# Patient Record
Sex: MALE | Race: WHITE
[De-identification: names, ages, dates, MRNs, and addresses within clinical notes are randomized per-mention and may not be internally consistent; named-entity substitution may affect disease eponyms.]

---

## 2017-04-10 ENCOUNTER — HOSPITAL ENCOUNTER (EMERGENCY)
Dept: HOSPITAL 17 - PHEFT | Age: 37
Discharge: HOME | End: 2017-04-10
Payer: SELF-PAY

## 2017-04-10 VITALS — WEIGHT: 168.65 LBS | BODY MASS INDEX: 23.61 KG/M2 | HEIGHT: 71 IN

## 2017-04-10 VITALS
HEART RATE: 70 BPM | OXYGEN SATURATION: 98 % | DIASTOLIC BLOOD PRESSURE: 77 MMHG | SYSTOLIC BLOOD PRESSURE: 108 MMHG | TEMPERATURE: 98.1 F | RESPIRATION RATE: 16 BRPM

## 2017-04-10 DIAGNOSIS — S05.02XA: Primary | ICD-10-CM
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
## 2017-04-10 DIAGNOSIS — F17.210: ICD-10-CM
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
## 2017-04-10 PROCEDURE — 99283 EMERGENCY DEPT VISIT LOW MDM: CPT
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
## 2018-02-21 ENCOUNTER — HOSPITAL ENCOUNTER (EMERGENCY)
Dept: HOSPITAL 17 - NEPD | Age: 38
Discharge: HOME | End: 2018-02-21
Payer: COMMERCIAL

## 2018-02-21 VITALS
SYSTOLIC BLOOD PRESSURE: 122 MMHG | OXYGEN SATURATION: 97 % | RESPIRATION RATE: 16 BRPM | HEART RATE: 111 BPM | DIASTOLIC BLOOD PRESSURE: 81 MMHG | TEMPERATURE: 98.2 F

## 2018-02-21 DIAGNOSIS — G89.29: ICD-10-CM

## 2018-02-21 DIAGNOSIS — M54.9: ICD-10-CM

## 2018-02-21 DIAGNOSIS — Z79.891: ICD-10-CM
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
## 2018-02-21 DIAGNOSIS — F17.210: ICD-10-CM
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
## 2018-02-21 DIAGNOSIS — S01.01XA: Primary | ICD-10-CM

## 2018-02-21 DIAGNOSIS — S16.1XXA: ICD-10-CM

## 2018-02-21 PROCEDURE — 71046 X-RAY EXAM CHEST 2 VIEWS: CPT

## 2018-02-21 PROCEDURE — 12002 RPR S/N/AX/GEN/TRNK2.6-7.5CM: CPT

## 2018-02-21 PROCEDURE — 72125 CT NECK SPINE W/O DYE: CPT

## 2018-02-21 PROCEDURE — 70450 CT HEAD/BRAIN W/O DYE: CPT
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
## 2018-02-24 ENCOUNTER — HOSPITAL ENCOUNTER (EMERGENCY)
Dept: HOSPITAL 17 - NEPD | Age: 38
Discharge: HOME | End: 2018-02-24
Payer: COMMERCIAL

## 2018-02-24 VITALS — HEIGHT: 69 IN | BODY MASS INDEX: 25.14 KG/M2 | WEIGHT: 169.76 LBS

## 2018-02-24 DIAGNOSIS — F17.200: ICD-10-CM

## 2018-02-24 DIAGNOSIS — G44.319: Primary | ICD-10-CM

## 2018-02-24 PROCEDURE — 99283 EMERGENCY DEPT VISIT LOW MDM: CPT
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
## 2018-03-10 ENCOUNTER — HOSPITAL ENCOUNTER (EMERGENCY)
Dept: HOSPITAL 17 - NED | Age: 38
Discharge: LEFT BEFORE DISCHARGE | End: 2018-03-10
Payer: SELF-PAY

## 2018-03-10 ENCOUNTER — HOSPITAL ENCOUNTER (EMERGENCY)
Dept: HOSPITAL 17 - NEPD | Age: 38
Discharge: HOME | End: 2018-03-10
Payer: SELF-PAY

## 2018-03-10 VITALS
OXYGEN SATURATION: 98 % | TEMPERATURE: 98.5 F | DIASTOLIC BLOOD PRESSURE: 77 MMHG | RESPIRATION RATE: 18 BRPM | HEART RATE: 124 BPM | SYSTOLIC BLOOD PRESSURE: 132 MMHG

## 2018-03-10 VITALS — WEIGHT: 165.35 LBS | BODY MASS INDEX: 25.95 KG/M2 | HEIGHT: 67 IN

## 2018-03-10 DIAGNOSIS — Z09: Primary | ICD-10-CM

## 2018-03-10 DIAGNOSIS — Z53.21: ICD-10-CM

## 2018-03-10 DIAGNOSIS — Z48.02: ICD-10-CM

## 2018-03-10 DIAGNOSIS — Z48.02: Primary | ICD-10-CM

## 2018-03-10 PROCEDURE — 99281 EMR DPT VST MAYX REQ PHY/QHP: CPT
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
